# Patient Record
Sex: FEMALE | Race: WHITE | Employment: OTHER | URBAN - METROPOLITAN AREA
[De-identification: names, ages, dates, MRNs, and addresses within clinical notes are randomized per-mention and may not be internally consistent; named-entity substitution may affect disease eponyms.]

---

## 2024-04-01 ENCOUNTER — TELEPHONE (OUTPATIENT)
Age: 69
End: 2024-04-01

## 2024-04-01 NOTE — TELEPHONE ENCOUNTER
----- Message from Violette Morgan sent at 4/1/2024 10:24 AM EDT -----  Subject: Appointment Request    Reason for Call: New Patient/New to Provider Appointment needed: New   Patient Request Appointment    QUESTIONS    Reason for appointment request? Available appointments did not meet   patient need     Additional Information for Provider? pt would like to know if she would   take her on as a new pt. She use to bring Diana Buchanan which was her   mother law to see her. her home phone is also a business number   ---------------------------------------------------------------------------  --------------  CALL BACK INFO  [phone]; OK to leave message on voicemail  ---------------------------------------------------------------------------  --------------  SCRIPT ANSWERS

## 2024-04-02 ENCOUNTER — TELEPHONE (OUTPATIENT)
Age: 69
End: 2024-04-02

## 2024-04-02 NOTE — TELEPHONE ENCOUNTER
PSR attempted to reach out to patient to inform of providers note -  no answer, left detailed VM and call back if she would like to schedule with Allan

## 2025-08-04 ENCOUNTER — APPOINTMENT (OUTPATIENT)
Facility: HOSPITAL | Age: 70
End: 2025-08-04
Payer: MEDICARE

## 2025-08-04 ENCOUNTER — HOSPITAL ENCOUNTER (EMERGENCY)
Facility: HOSPITAL | Age: 70
Discharge: HOME | End: 2025-08-04
Attending: EMERGENCY MEDICINE
Payer: MEDICARE

## 2025-08-04 VITALS
TEMPERATURE: 98.2 F | RESPIRATION RATE: 16 BRPM | HEART RATE: 86 BPM | DIASTOLIC BLOOD PRESSURE: 54 MMHG | SYSTOLIC BLOOD PRESSURE: 144 MMHG | OXYGEN SATURATION: 96 %

## 2025-08-04 DIAGNOSIS — T59.91XA TOXIC EFFECT OF FUMES, ACCIDENTAL OR UNINTENTIONAL, INITIAL ENCOUNTER: Primary | ICD-10-CM

## 2025-08-04 PROCEDURE — 6370000000 HC RX 637 (ALT 250 FOR IP): Performed by: EMERGENCY MEDICINE

## 2025-08-04 PROCEDURE — 6360000002 HC RX W HCPCS: Performed by: EMERGENCY MEDICINE

## 2025-08-04 PROCEDURE — 71046 X-RAY EXAM CHEST 2 VIEWS: CPT

## 2025-08-04 PROCEDURE — 99283 EMERGENCY DEPT VISIT LOW MDM: CPT

## 2025-08-04 RX ORDER — ALBUTEROL SULFATE 90 UG/1
2 INHALANT RESPIRATORY (INHALATION) 4 TIMES DAILY PRN
Qty: 54 G | Refills: 1 | Status: SHIPPED | OUTPATIENT
Start: 2025-08-04

## 2025-08-04 RX ORDER — SIMVASTATIN 10 MG
10 TABLET ORAL NIGHTLY
COMMUNITY

## 2025-08-04 RX ADMIN — ALBUTEROL SULFATE 1 DOSE: 2.5 SOLUTION RESPIRATORY (INHALATION) at 14:05

## 2025-08-04 ASSESSMENT — PAIN SCALES - GENERAL
PAINLEVEL_OUTOF10: 6
PAINLEVEL_OUTOF10: 0

## 2025-08-04 ASSESSMENT — PAIN DESCRIPTION - DESCRIPTORS: DESCRIPTORS: BURNING

## 2025-08-04 ASSESSMENT — PAIN - FUNCTIONAL ASSESSMENT
PAIN_FUNCTIONAL_ASSESSMENT: ACTIVITIES ARE NOT PREVENTED
PAIN_FUNCTIONAL_ASSESSMENT: 0-10

## 2025-08-04 ASSESSMENT — PAIN DESCRIPTION - LOCATION: LOCATION: THROAT
